# Patient Record
Sex: MALE | URBAN - METROPOLITAN AREA
[De-identification: names, ages, dates, MRNs, and addresses within clinical notes are randomized per-mention and may not be internally consistent; named-entity substitution may affect disease eponyms.]

---

## 2020-05-06 ENCOUNTER — APPOINTMENT (OUTPATIENT)
Dept: URBAN - METROPOLITAN AREA CLINIC 319 | Age: 50
Setting detail: DERMATOLOGY
End: 2020-05-06

## 2020-05-06 DIAGNOSIS — D69.2 OTHER NONTHROMBOCYTOPENIC PURPURA: ICD-10-CM

## 2020-05-06 PROCEDURE — OTHER COUNSELING: OTHER

## 2020-05-06 PROCEDURE — OTHER MIPS QUALITY: OTHER

## 2020-05-06 PROCEDURE — 99202 OFFICE O/P NEW SF 15 MIN: CPT

## 2020-05-06 PROCEDURE — OTHER REASSURANCE: OTHER

## 2020-05-06 ASSESSMENT — LOCATION SIMPLE DESCRIPTION DERM
LOCATION SIMPLE: RIGHT FOREARM
LOCATION SIMPLE: LEFT FOREARM

## 2020-05-06 ASSESSMENT — LOCATION ZONE DERM: LOCATION ZONE: ARM

## 2020-05-06 ASSESSMENT — LOCATION DETAILED DESCRIPTION DERM
LOCATION DETAILED: LEFT DISTAL DORSAL FOREARM
LOCATION DETAILED: RIGHT PROXIMAL DORSAL FOREARM

## 2020-05-06 NOTE — PROCEDURE: COUNSELING
Patient Specific Counseling (Will Not Stick From Patient To Patient): Discussed with patient the propensity for this to occur on extensor surfaces. No further work up is recommended for this condition.
Detail Level: Zone

## 2020-05-06 NOTE — HPI: BRUISES (PURPURA SIMPLEX)
How Severe Is It?: mild
Is This A New Presentation, Or A Follow-Up?: Bruise
Additional History: Patient states he bumped his arm mowing the lawn and noticed a strange bruising.

## 2020-05-06 NOTE — PROCEDURE: REASSURANCE
Hide Additional Notes?: No
Detail Level: Zone
Additional Notes (Optional): Likely more severe due to baby aspirin.

## 2020-05-06 NOTE — PROCEDURE: MIPS QUALITY
Detail Level: Generalized
Quality 402: Tobacco Use And Help With Quitting Among Adolescents: Patient screened for tobacco and never smoked
Quality 110: Preventive Care And Screening: Influenza Immunization: Influenza immunization was not ordered or administered, reason not given